# Patient Record
Sex: MALE | Race: WHITE | NOT HISPANIC OR LATINO | URBAN - METROPOLITAN AREA
[De-identification: names, ages, dates, MRNs, and addresses within clinical notes are randomized per-mention and may not be internally consistent; named-entity substitution may affect disease eponyms.]

---

## 2023-03-28 ENCOUNTER — OFFICE VISIT (OUTPATIENT)
Dept: INTERNAL MEDICINE | Facility: CLINIC | Age: 58
End: 2023-03-28
Payer: COMMERCIAL

## 2023-03-28 ENCOUNTER — LAB (OUTPATIENT)
Dept: LAB | Facility: HOSPITAL | Age: 58
End: 2023-03-28
Payer: COMMERCIAL

## 2023-03-28 VITALS
OXYGEN SATURATION: 98 % | HEART RATE: 85 BPM | TEMPERATURE: 98 F | DIASTOLIC BLOOD PRESSURE: 60 MMHG | BODY MASS INDEX: 33.38 KG/M2 | HEIGHT: 71 IN | SYSTOLIC BLOOD PRESSURE: 114 MMHG | WEIGHT: 238.4 LBS

## 2023-03-28 DIAGNOSIS — Z23 NEED FOR VACCINATION: ICD-10-CM

## 2023-03-28 DIAGNOSIS — S99.922A INJURY OF TOE ON LEFT FOOT, INITIAL ENCOUNTER: ICD-10-CM

## 2023-03-28 DIAGNOSIS — Z00.00 PREVENTATIVE HEALTH CARE: Primary | ICD-10-CM

## 2023-03-28 DIAGNOSIS — Z13.29 SCREENING FOR ENDOCRINE DISORDER: ICD-10-CM

## 2023-03-28 DIAGNOSIS — Z12.11 SCREEN FOR COLON CANCER: ICD-10-CM

## 2023-03-28 DIAGNOSIS — F32.0 MILD MAJOR DEPRESSION: ICD-10-CM

## 2023-03-28 DIAGNOSIS — Z13.220 SCREENING, LIPID: ICD-10-CM

## 2023-03-28 DIAGNOSIS — M54.2 NECK PAIN: ICD-10-CM

## 2023-03-28 DIAGNOSIS — Z12.5 SCREENING PSA (PROSTATE SPECIFIC ANTIGEN): ICD-10-CM

## 2023-03-28 DIAGNOSIS — Z13.0 SCREENING, IRON DEFICIENCY ANEMIA: ICD-10-CM

## 2023-03-28 DIAGNOSIS — J45.990 EXERCISE-INDUCED ASTHMA: ICD-10-CM

## 2023-03-28 DIAGNOSIS — Z11.59 ENCOUNTER FOR HEPATITIS C SCREENING TEST FOR LOW RISK PATIENT: ICD-10-CM

## 2023-03-28 DIAGNOSIS — E66.9 OBESITY (BMI 30.0-34.9): ICD-10-CM

## 2023-03-28 PROBLEM — E66.811 OBESITY (BMI 30.0-34.9): Status: ACTIVE | Noted: 2023-03-28

## 2023-03-28 LAB
ALBUMIN SERPL-MCNC: 4.7 G/DL (ref 3.5–5.2)
ALBUMIN/GLOB SERPL: 1.3 G/DL
ALP SERPL-CCNC: 70 U/L (ref 39–117)
ALT SERPL W P-5'-P-CCNC: 33 U/L (ref 1–41)
ANION GAP SERPL CALCULATED.3IONS-SCNC: 9 MMOL/L (ref 5–15)
AST SERPL-CCNC: 25 U/L (ref 1–40)
BASOPHILS # BLD AUTO: 0.1 10*3/MM3 (ref 0–0.2)
BASOPHILS NFR BLD AUTO: 1.1 % (ref 0–1.5)
BILIRUB SERPL-MCNC: 0.6 MG/DL (ref 0–1.2)
BUN SERPL-MCNC: 11 MG/DL (ref 6–20)
BUN/CREAT SERPL: 10.5 (ref 7–25)
CALCIUM SPEC-SCNC: 9.6 MG/DL (ref 8.6–10.5)
CHLORIDE SERPL-SCNC: 100 MMOL/L (ref 98–107)
CHOLEST SERPL-MCNC: 202 MG/DL (ref 0–200)
CO2 SERPL-SCNC: 29 MMOL/L (ref 22–29)
CREAT SERPL-MCNC: 1.05 MG/DL (ref 0.76–1.27)
DEPRECATED RDW RBC AUTO: 41.6 FL (ref 37–54)
EGFRCR SERPLBLD CKD-EPI 2021: 83.8 ML/MIN/1.73
EOSINOPHIL # BLD AUTO: 0.11 10*3/MM3 (ref 0–0.4)
EOSINOPHIL NFR BLD AUTO: 1.3 % (ref 0.3–6.2)
ERYTHROCYTE [DISTWIDTH] IN BLOOD BY AUTOMATED COUNT: 12.8 % (ref 12.3–15.4)
GLOBULIN UR ELPH-MCNC: 3.5 GM/DL
GLUCOSE SERPL-MCNC: 85 MG/DL (ref 65–99)
HCT VFR BLD AUTO: 44.3 % (ref 37.5–51)
HCV AB SER DONR QL: NORMAL
HDLC SERPL-MCNC: 64 MG/DL (ref 40–60)
HGB BLD-MCNC: 15.2 G/DL (ref 13–17.7)
IMM GRANULOCYTES # BLD AUTO: 0.06 10*3/MM3 (ref 0–0.05)
IMM GRANULOCYTES NFR BLD AUTO: 0.7 % (ref 0–0.5)
LDLC SERPL CALC-MCNC: 119 MG/DL (ref 0–100)
LDLC/HDLC SERPL: 1.83 {RATIO}
LYMPHOCYTES # BLD AUTO: 1.95 10*3/MM3 (ref 0.7–3.1)
LYMPHOCYTES NFR BLD AUTO: 22.2 % (ref 19.6–45.3)
MCH RBC QN AUTO: 31 PG (ref 26.6–33)
MCHC RBC AUTO-ENTMCNC: 34.3 G/DL (ref 31.5–35.7)
MCV RBC AUTO: 90.2 FL (ref 79–97)
MONOCYTES # BLD AUTO: 0.52 10*3/MM3 (ref 0.1–0.9)
MONOCYTES NFR BLD AUTO: 5.9 % (ref 5–12)
NEUTROPHILS NFR BLD AUTO: 6.05 10*3/MM3 (ref 1.7–7)
NEUTROPHILS NFR BLD AUTO: 68.8 % (ref 42.7–76)
NRBC BLD AUTO-RTO: 0 /100 WBC (ref 0–0.2)
PLATELET # BLD AUTO: 248 10*3/MM3 (ref 140–450)
PMV BLD AUTO: 8.9 FL (ref 6–12)
POTASSIUM SERPL-SCNC: 4.2 MMOL/L (ref 3.5–5.2)
PROT SERPL-MCNC: 8.2 G/DL (ref 6–8.5)
PSA SERPL-MCNC: 0.91 NG/ML (ref 0–4)
RBC # BLD AUTO: 4.91 10*6/MM3 (ref 4.14–5.8)
SODIUM SERPL-SCNC: 138 MMOL/L (ref 136–145)
TRIGL SERPL-MCNC: 105 MG/DL (ref 0–150)
TSH SERPL DL<=0.05 MIU/L-ACNC: 0.5 UIU/ML (ref 0.27–4.2)
VLDLC SERPL-MCNC: 19 MG/DL (ref 5–40)
WBC NRBC COR # BLD: 8.79 10*3/MM3 (ref 3.4–10.8)

## 2023-03-28 PROCEDURE — 80061 LIPID PANEL: CPT

## 2023-03-28 PROCEDURE — 80050 GENERAL HEALTH PANEL: CPT

## 2023-03-28 PROCEDURE — 86803 HEPATITIS C AB TEST: CPT

## 2023-03-28 PROCEDURE — G0103 PSA SCREENING: HCPCS

## 2023-03-28 RX ORDER — BUPROPION HYDROCHLORIDE 150 MG/1
150 TABLET ORAL DAILY
Qty: 30 TABLET | Refills: 1 | Status: SHIPPED | OUTPATIENT
Start: 2023-03-28

## 2023-03-28 RX ORDER — SILDENAFIL 50 MG/1
50 TABLET, FILM COATED ORAL DAILY PRN
COMMUNITY

## 2023-03-28 RX ORDER — ALBUTEROL SULFATE 90 UG/1
2 AEROSOL, METERED RESPIRATORY (INHALATION) EVERY 6 HOURS PRN
Qty: 18 G | Refills: 0 | Status: SHIPPED | OUTPATIENT
Start: 2023-03-28

## 2023-03-28 NOTE — ASSESSMENT & PLAN NOTE
Mild depression. Get labs and colonoscopy and tdap. Age-appropriate Counseling:  Discussed preventative medicine issues with patient including regular exercise, healthy diet, stress reduction, adequate sleep and recommended age-appropriate screening studies.  Immunizations reviewed.

## 2023-03-28 NOTE — PROGRESS NOTES
"Chief Complaint   Patient presents with   • Nail Problem     Fungus on toes   • Neck Pain     Stiffness in neck   • Asthma     Exercise-induced   • Toe Injury     Left index toe       History of Present Illness  57 y.o. male presents for wellness exam. He has mild depressive symptoms.     Review of Systems   Constitutional: Negative for chills and fever.   Respiratory: Negative for cough and shortness of breath.    Cardiovascular: Negative for chest pain and palpitations.   Gastrointestinal: Negative for abdominal pain, nausea and vomiting.   Musculoskeletal: Positive for arthralgias and neck pain.   Skin: Negative for rash.   Neurological: Negative for dizziness, light-headedness and headaches.   Psychiatric/Behavioral: Positive for decreased concentration and dysphoric mood. Negative for suicidal ideas.   All other systems reviewed and are negative.    .    PMSFH:  The following portions of the patient's history were reviewed and updated as appropriate: allergies, current medications, past family history, past medical history, past social history, past surgical history and problem list.      Current Outpatient Medications:   •  sildenafil (VIAGRA) 50 MG tablet, Take 1 tablet by mouth Daily As Needed for Erectile Dysfunction., Disp: , Rfl:   •  albuterol sulfate  (90 Base) MCG/ACT inhaler, Inhale 2 puffs Every 6 (Six) Hours As Needed for Wheezing or Shortness of Air., Disp: 18 g, Rfl: 0  •  buPROPion XL (WELLBUTRIN XL) 150 MG 24 hr tablet, Take 1 tablet by mouth Daily., Disp: 30 tablet, Rfl: 1    VITALS:  /60 (BP Location: Left arm, Patient Position: Sitting, Cuff Size: Adult)   Pulse 85   Temp 98 °F (36.7 °C) (Infrared)   Ht 179.5 cm (70.67\")   Wt 108 kg (238 lb 6.4 oz)   SpO2 98%   BMI 33.56 kg/m²     Physical Exam  Vitals and nursing note reviewed.   Constitutional:       Appearance: Normal appearance. He is well-developed.   HENT:      Head: Normocephalic.      Right Ear: Tympanic membrane " and ear canal normal.      Left Ear: Tympanic membrane and ear canal normal.   Eyes:      Extraocular Movements: Extraocular movements intact.      Conjunctiva/sclera: Conjunctivae normal.   Cardiovascular:      Rate and Rhythm: Normal rate and regular rhythm.      Heart sounds: Normal heart sounds.   Pulmonary:      Effort: Pulmonary effort is normal. No respiratory distress.      Breath sounds: Normal breath sounds.   Abdominal:      General: Bowel sounds are normal.      Palpations: Abdomen is soft.      Tenderness: There is no abdominal tenderness.      Comments: Obese    Genitourinary:     Comments: 1+ prostate   Skin:     General: Skin is warm and dry.   Neurological:      General: No focal deficit present.      Mental Status: He is alert and oriented to person, place, and time.   Psychiatric:         Mood and Affect: Mood normal.         Behavior: Behavior normal.         LABS:    CBC w/ diff: No results found for: WBC, RBC, HGB, HCT, MCV, MCH, MCHC, RDW, PLT, NEUTRORELPCT, AUTOIGPER, LYMPHORELPCT, MONORELPCT, EOSRELPCT, BASORELPCT  Procedures         ASSESSMENT/PLAN:  Problems Addressed this Visit        Endocrine and Metabolic    Obesity (BMI 30.0-34.9)     Patient's (Body mass index is 33.56 kg/m².) indicates that they are obese (BMI >30) with health conditions that include osteoarthritis . Weight is improving with lifestyle modifications. BMI  is above average; BMI management plan is completed. We discussed portion control and increasing exercise.             Health Encounters    Preventative health care - Primary     Mild depression. Get labs and colonoscopy and tdap. Age-appropriate Counseling:  Discussed preventative medicine issues with patient including regular exercise, healthy diet, stress reduction, adequate sleep and recommended age-appropriate screening studies.  Immunizations reviewed.                 Mental Health    Mild major depression (HCC)     Patient's depression is single episode and is  mild without psychosis. Their depression is currently active and the condition is newly identified. This will be reassessed at the next regular appointment. F/U as described:patient was prescribed an antidepressant medicine Use wellbutrin .         Relevant Medications    buPROPion XL (WELLBUTRIN XL) 150 MG 24 hr tablet       Pulmonary and Pneumonias    Exercise-induced asthma     Use albuterol as needed              Relevant Medications    albuterol sulfate  (90 Base) MCG/ACT inhaler   Other Visit Diagnoses     Screening, lipid        Relevant Orders    Lipid Panel    Screening for endocrine disorder        Relevant Orders    Comprehensive Metabolic Panel    TSH Rfx On Abnormal To Free T4    Screening PSA (prostate specific antigen)        Relevant Orders    PSA Screen    Screen for colon cancer        Relevant Orders    Ambulatory Referral For Screening Colonoscopy    Encounter for hepatitis C screening test for low risk patient        Relevant Orders    Hepatitis C Antibody    Need for vaccination        Relevant Orders    Tdap Vaccine Greater Than or Equal To 6yo IM (Completed)    Screening, iron deficiency anemia        Relevant Orders    CBC & Differential    Injury of toe on left foot, initial encounter        Likely fracture 2nd toe left side and doing better so declined xray or Ortho.     Neck pain        Benign exam and doing better so declined xray or specialist       Diagnoses       Codes Comments    Preventative health care    -  Primary ICD-10-CM: Z00.00  ICD-9-CM: V70.0     Obesity (BMI 30.0-34.9)     ICD-10-CM: E66.9  ICD-9-CM: 278.00     Exercise-induced asthma     ICD-10-CM: J45.990  ICD-9-CM: 493.81     Screening, lipid     ICD-10-CM: Z13.220  ICD-9-CM: V77.91     Screening for endocrine disorder     ICD-10-CM: Z13.29  ICD-9-CM: V77.99     Screening PSA (prostate specific antigen)     ICD-10-CM: Z12.5  ICD-9-CM: V76.44     Screen for colon cancer     ICD-10-CM: Z12.11  ICD-9-CM: V76.51      Encounter for hepatitis C screening test for low risk patient     ICD-10-CM: Z11.59  ICD-9-CM: V73.89     Need for vaccination     ICD-10-CM: Z23  ICD-9-CM: V05.9     Screening, iron deficiency anemia     ICD-10-CM: Z13.0  ICD-9-CM: V78.0     Injury of toe on left foot, initial encounter     ICD-10-CM: S99.922A  ICD-9-CM: 959.7 Likely fracture 2nd toe left side and doing better so declined xray or Ortho.     Neck pain     ICD-10-CM: M54.2  ICD-9-CM: 723.1 Benign exam and doing better so declined xray or specialist     Mild major depression (HCC)     ICD-10-CM: F32.0  ICD-9-CM: 296.21           FOLLOW-UP:  Return in about 6 weeks (around 5/9/2023).      Electronically signed by:    Abelardo Oates MD

## 2023-03-28 NOTE — ASSESSMENT & PLAN NOTE
Patient's (Body mass index is 33.56 kg/m².) indicates that they are obese (BMI >30) with health conditions that include osteoarthritis . Weight is improving with lifestyle modifications. BMI  is above average; BMI management plan is completed. We discussed portion control and increasing exercise.

## 2023-03-28 NOTE — ASSESSMENT & PLAN NOTE
Patient's depression is single episode and is mild without psychosis. Their depression is currently active and the condition is newly identified. This will be reassessed at the next regular appointment. F/U as described:patient was prescribed an antidepressant medicine Use wellbutrin .

## 2023-03-30 PROBLEM — E78.5 HYPERLIPIDEMIA LDL GOAL <100: Status: ACTIVE | Noted: 2023-03-30

## 2023-03-31 ENCOUNTER — PATIENT MESSAGE (OUTPATIENT)
Dept: INTERNAL MEDICINE | Facility: CLINIC | Age: 58
End: 2023-03-31
Payer: COMMERCIAL

## 2023-03-31 NOTE — TELEPHONE ENCOUNTER
From: Khari Wilson  To: Abelardo Oates  Sent: 3/31/2023 2:44 PM EDT  Subject: A1C,std    I was curious if I have an A1C number and also if I was tested for STDs  thanks

## 2023-04-05 ENCOUNTER — PATIENT ROUNDING (BHMG ONLY) (OUTPATIENT)
Dept: INTERNAL MEDICINE | Facility: CLINIC | Age: 58
End: 2023-04-05
Payer: COMMERCIAL

## 2023-04-27 RX ORDER — SODIUM, POTASSIUM,MAG SULFATES 17.5-3.13G
SOLUTION, RECONSTITUTED, ORAL ORAL
Qty: 354 ML | Refills: 0 | Status: SHIPPED | OUTPATIENT
Start: 2023-04-27

## 2023-05-09 ENCOUNTER — OFFICE VISIT (OUTPATIENT)
Dept: INTERNAL MEDICINE | Facility: CLINIC | Age: 58
End: 2023-05-09
Payer: COMMERCIAL

## 2023-05-09 VITALS
SYSTOLIC BLOOD PRESSURE: 104 MMHG | DIASTOLIC BLOOD PRESSURE: 72 MMHG | HEART RATE: 95 BPM | TEMPERATURE: 98.3 F | WEIGHT: 235 LBS | HEIGHT: 71 IN | BODY MASS INDEX: 32.9 KG/M2

## 2023-05-09 DIAGNOSIS — E78.5 HYPERLIPIDEMIA LDL GOAL <100: ICD-10-CM

## 2023-05-09 DIAGNOSIS — F32.0 MILD MAJOR DEPRESSION: Primary | ICD-10-CM

## 2023-05-09 DIAGNOSIS — B35.1 TOENAIL FUNGUS: ICD-10-CM

## 2023-05-09 DIAGNOSIS — E66.9 OBESITY (BMI 30.0-34.9): ICD-10-CM

## 2023-05-09 RX ORDER — BUPROPION HYDROCHLORIDE 150 MG/1
150 TABLET ORAL DAILY
Qty: 90 TABLET | Refills: 2 | Status: SHIPPED | OUTPATIENT
Start: 2023-05-09

## 2023-05-09 RX ORDER — TERBINAFINE HYDROCHLORIDE 250 MG/1
250 TABLET ORAL DAILY
Qty: 84 TABLET | Refills: 0 | Status: SHIPPED | OUTPATIENT
Start: 2023-05-09 | End: 2023-08-01

## 2023-05-09 NOTE — ASSESSMENT & PLAN NOTE
Patient's (Body mass index is 33.08 kg/m².) indicates that they are obese (BMI >30) with health conditions that include dyslipidemias . Weight is unchanged. BMI  is above average; BMI management plan is completed. We discussed portion control and increasing exercise.

## 2023-05-09 NOTE — PROGRESS NOTES
"Chief Complaint   Patient presents with   • Depression       History of Present Illness  57 y.o. male presents for follow up cholesterol and depression.     Review of Systems   Constitutional: Negative for chills and fever.   Respiratory: Negative for cough and shortness of breath.    Cardiovascular: Negative for chest pain and palpitations.   Gastrointestinal: Negative for abdominal pain, nausea and vomiting.   Skin: Negative for rash.   Neurological: Positive for dizziness (rare fleeting dizziness ). Negative for light-headedness and headaches.   Psychiatric/Behavioral: Positive for decreased concentration and dysphoric mood.   All other systems reviewed and are negative.    .    PMSFH:  The following portions of the patient's history were reviewed and updated as appropriate: allergies, current medications, past family history, past medical history, past social history, past surgical history and problem list.      Current Outpatient Medications:   •  albuterol sulfate  (90 Base) MCG/ACT inhaler, Inhale 2 puffs Every 6 (Six) Hours As Needed for Wheezing or Shortness of Air., Disp: 18 g, Rfl: 0  •  buPROPion XL (WELLBUTRIN XL) 150 MG 24 hr tablet, Take 1 tablet by mouth Daily., Disp: 90 tablet, Rfl: 2  •  sildenafil (VIAGRA) 50 MG tablet, Take 1 tablet by mouth Daily As Needed for Erectile Dysfunction., Disp: , Rfl:   •  terbinafine (lamiSIL) 250 MG tablet, Take 1 tablet by mouth Daily for 84 days., Disp: 84 tablet, Rfl: 0    VITALS:  /72   Pulse 95   Temp 98.3 °F (36.8 °C)   Ht 179.5 cm (70.67\")   Wt 107 kg (235 lb)   BMI 33.08 kg/m²     Physical Exam  Vitals and nursing note reviewed.   Constitutional:       Appearance: Normal appearance. He is well-developed.   HENT:      Head: Normocephalic.   Eyes:      Extraocular Movements: Extraocular movements intact.      Conjunctiva/sclera: Conjunctivae normal.   Cardiovascular:      Rate and Rhythm: Normal rate and regular rhythm.      Heart sounds: " Normal heart sounds.   Pulmonary:      Effort: Pulmonary effort is normal. No respiratory distress.      Breath sounds: Normal breath sounds.   Abdominal:      General: Bowel sounds are normal.      Palpations: Abdomen is soft.      Tenderness: There is no abdominal tenderness.   Skin:     General: Skin is warm and dry.   Neurological:      General: No focal deficit present.      Mental Status: He is alert and oriented to person, place, and time.   Psychiatric:         Mood and Affect: Mood normal.         Behavior: Behavior normal.         LABS:    CMP:  Lab Results   Component Value Date    BUN 11 03/28/2023    CREATININE 1.05 03/28/2023     03/28/2023    K 4.2 03/28/2023     03/28/2023    CALCIUM 9.6 03/28/2023    ALBUMIN 4.7 03/28/2023    BILITOT 0.6 03/28/2023    ALKPHOS 70 03/28/2023    AST 25 03/28/2023    ALT 33 03/28/2023     CBC:  Lab Results   Component Value Date    WBC 8.79 03/28/2023    RBC 4.91 03/28/2023    HGB 15.2 03/28/2023    HCT 44.3 03/28/2023    MCV 90.2 03/28/2023    MCH 31.0 03/28/2023    MCHC 34.3 03/28/2023    RDW 12.8 03/28/2023     03/28/2023     LIPID PANEL:  Lab Results   Component Value Date    TRIG 105 03/28/2023    HDL 64 (H) 03/28/2023    VLDL 19 03/28/2023     (H) 03/28/2023    LDLHDL 1.83 03/28/2023     Procedures         ASSESSMENT/PLAN:  Problems Addressed this Visit        Cardiac and Vasculature    Hyperlipidemia LDL goal <100     Lipid abnormalities are improving with lifestyle modifications.  Nutritional counseling was provided.  Lipids will be reassessed in 1 year.            Endocrine and Metabolic    Obesity (BMI 30.0-34.9)     Patient's (Body mass index is 33.08 kg/m².) indicates that they are obese (BMI >30) with health conditions that include dyslipidemias . Weight is unchanged. BMI  is above average; BMI management plan is completed. We discussed portion control and increasing exercise.             Mental Health    Mild major depression -  Primary     Patient's depression is recurrent and is mild without psychosis. Their depression is currently in partial remission and the condition is improving with treatment. This will be reassessed at the next regular appointment. F/U as described:patient will continue current medication therapyCounseled patient regarding multimodal approach with healthy nutrition, healthy sleep, regular physical activity, social activities, and meditation.He is doing well with wellbutrin 150 daily and if does not does quite as well will increase to 300 mg per day  .         Relevant Medications    buPROPion XL (WELLBUTRIN XL) 150 MG 24 hr tablet   Other Visit Diagnoses     Toenail fungus        Try lamisil.     Relevant Medications    terbinafine (lamiSIL) 250 MG tablet      Diagnoses       Codes Comments    Mild major depression    -  Primary ICD-10-CM: F32.0  ICD-9-CM: 296.21     Obesity (BMI 30.0-34.9)     ICD-10-CM: E66.9  ICD-9-CM: 278.00     Hyperlipidemia LDL goal <100     ICD-10-CM: E78.5  ICD-9-CM: 272.4     Toenail fungus     ICD-10-CM: B35.1  ICD-9-CM: 110.1 Try lamisil.           FOLLOW-UP:  Return in about 11 months (around 4/9/2024) for Annual physical.      Electronically signed by:    Abelardo Oates MD

## 2023-05-09 NOTE — ASSESSMENT & PLAN NOTE
Patient's depression is recurrent and is mild without psychosis. Their depression is currently in partial remission and the condition is improving with treatment. This will be reassessed at the next regular appointment. F/U as described:patient will continue current medication therapyCounseled patient regarding multimodal approach with healthy nutrition, healthy sleep, regular physical activity, social activities, and meditation.He is doing well with wellbutrin 150 daily and if does not does quite as well will increase to 300 mg per day  .

## 2023-05-22 RX ORDER — SODIUM, POTASSIUM,MAG SULFATES 17.5-3.13G
SOLUTION, RECONSTITUTED, ORAL ORAL
Qty: 354 ML | Refills: 0 | Status: SHIPPED | OUTPATIENT
Start: 2023-05-22

## 2023-06-01 ENCOUNTER — OUTSIDE FACILITY SERVICE (OUTPATIENT)
Dept: GASTROENTEROLOGY | Facility: CLINIC | Age: 58
End: 2023-06-01
Payer: COMMERCIAL

## 2023-06-01 PROCEDURE — 45385 COLONOSCOPY W/LESION REMOVAL: CPT | Performed by: INTERNAL MEDICINE

## 2024-04-09 ENCOUNTER — OFFICE VISIT (OUTPATIENT)
Dept: INTERNAL MEDICINE | Facility: CLINIC | Age: 59
End: 2024-04-09
Payer: COMMERCIAL

## 2024-04-09 ENCOUNTER — LAB (OUTPATIENT)
Dept: LAB | Facility: HOSPITAL | Age: 59
End: 2024-04-09
Payer: COMMERCIAL

## 2024-04-09 VITALS
HEIGHT: 71 IN | TEMPERATURE: 97.1 F | WEIGHT: 240.2 LBS | OXYGEN SATURATION: 97 % | HEART RATE: 66 BPM | DIASTOLIC BLOOD PRESSURE: 74 MMHG | BODY MASS INDEX: 33.63 KG/M2 | SYSTOLIC BLOOD PRESSURE: 108 MMHG

## 2024-04-09 DIAGNOSIS — E66.9 OBESITY (BMI 30.0-34.9): ICD-10-CM

## 2024-04-09 DIAGNOSIS — E78.5 HYPERLIPIDEMIA LDL GOAL <100: ICD-10-CM

## 2024-04-09 DIAGNOSIS — Z00.00 PREVENTATIVE HEALTH CARE: Primary | ICD-10-CM

## 2024-04-09 DIAGNOSIS — R21 RASH: ICD-10-CM

## 2024-04-09 DIAGNOSIS — N40.1 BENIGN PROSTATIC HYPERPLASIA WITH URINARY FREQUENCY: ICD-10-CM

## 2024-04-09 DIAGNOSIS — Z12.5 SCREENING PSA (PROSTATE SPECIFIC ANTIGEN): ICD-10-CM

## 2024-04-09 DIAGNOSIS — M54.2 CERVICALGIA: ICD-10-CM

## 2024-04-09 DIAGNOSIS — F32.0 MILD MAJOR DEPRESSION: ICD-10-CM

## 2024-04-09 DIAGNOSIS — R35.0 BENIGN PROSTATIC HYPERPLASIA WITH URINARY FREQUENCY: ICD-10-CM

## 2024-04-09 DIAGNOSIS — B35.1 TOENAIL FUNGUS: ICD-10-CM

## 2024-04-09 PROBLEM — F98.8 ATTENTION DEFICIT DISORDER (ADD) WITHOUT HYPERACTIVITY: Status: ACTIVE | Noted: 2024-04-09

## 2024-04-09 LAB
ALBUMIN SERPL-MCNC: 4.4 G/DL (ref 3.5–5.2)
ALBUMIN/GLOB SERPL: 1.3 G/DL
ALP SERPL-CCNC: 74 U/L (ref 39–117)
ALT SERPL W P-5'-P-CCNC: 28 U/L (ref 1–41)
ANION GAP SERPL CALCULATED.3IONS-SCNC: 9.7 MMOL/L (ref 5–15)
AST SERPL-CCNC: 24 U/L (ref 1–40)
BASOPHILS # BLD AUTO: 0.13 10*3/MM3 (ref 0–0.2)
BASOPHILS NFR BLD AUTO: 1.8 % (ref 0–1.5)
BILIRUB SERPL-MCNC: 0.4 MG/DL (ref 0–1.2)
BUN SERPL-MCNC: 16 MG/DL (ref 6–20)
BUN/CREAT SERPL: 15.4 (ref 7–25)
CALCIUM SPEC-SCNC: 9.5 MG/DL (ref 8.6–10.5)
CHLORIDE SERPL-SCNC: 102 MMOL/L (ref 98–107)
CHOLEST SERPL-MCNC: 225 MG/DL (ref 0–200)
CO2 SERPL-SCNC: 26.3 MMOL/L (ref 22–29)
CREAT SERPL-MCNC: 1.04 MG/DL (ref 0.76–1.27)
DEPRECATED RDW RBC AUTO: 42.9 FL (ref 37–54)
EGFRCR SERPLBLD CKD-EPI 2021: 84.3 ML/MIN/1.73
EOSINOPHIL # BLD AUTO: 0.21 10*3/MM3 (ref 0–0.4)
EOSINOPHIL NFR BLD AUTO: 2.9 % (ref 0.3–6.2)
ERYTHROCYTE [DISTWIDTH] IN BLOOD BY AUTOMATED COUNT: 13 % (ref 12.3–15.4)
GLOBULIN UR ELPH-MCNC: 3.3 GM/DL
GLUCOSE SERPL-MCNC: 87 MG/DL (ref 65–99)
HCT VFR BLD AUTO: 47.2 % (ref 37.5–51)
HCYS SERPL-MCNC: 18.3 UMOL/L (ref 0–15)
HDLC SERPL-MCNC: 75 MG/DL (ref 40–60)
HGB BLD-MCNC: 15.8 G/DL (ref 13–17.7)
IMM GRANULOCYTES # BLD AUTO: 0.12 10*3/MM3 (ref 0–0.05)
IMM GRANULOCYTES NFR BLD AUTO: 1.7 % (ref 0–0.5)
LDLC SERPL CALC-MCNC: 132 MG/DL (ref 0–100)
LDLC/HDLC SERPL: 1.72 {RATIO}
LYMPHOCYTES # BLD AUTO: 2.21 10*3/MM3 (ref 0.7–3.1)
LYMPHOCYTES NFR BLD AUTO: 30.6 % (ref 19.6–45.3)
MCH RBC QN AUTO: 30.2 PG (ref 26.6–33)
MCHC RBC AUTO-ENTMCNC: 33.5 G/DL (ref 31.5–35.7)
MCV RBC AUTO: 90.2 FL (ref 79–97)
MONOCYTES # BLD AUTO: 0.69 10*3/MM3 (ref 0.1–0.9)
MONOCYTES NFR BLD AUTO: 9.5 % (ref 5–12)
NEUTROPHILS NFR BLD AUTO: 3.87 10*3/MM3 (ref 1.7–7)
NEUTROPHILS NFR BLD AUTO: 53.5 % (ref 42.7–76)
NRBC BLD AUTO-RTO: 0 /100 WBC (ref 0–0.2)
PLATELET # BLD AUTO: 263 10*3/MM3 (ref 140–450)
PMV BLD AUTO: 8.6 FL (ref 6–12)
POTASSIUM SERPL-SCNC: 5.1 MMOL/L (ref 3.5–5.2)
PROT SERPL-MCNC: 7.7 G/DL (ref 6–8.5)
PSA SERPL-MCNC: 1 NG/ML (ref 0–4)
RBC # BLD AUTO: 5.23 10*6/MM3 (ref 4.14–5.8)
SODIUM SERPL-SCNC: 138 MMOL/L (ref 136–145)
TRIGL SERPL-MCNC: 104 MG/DL (ref 0–150)
TSH SERPL DL<=0.05 MIU/L-ACNC: 1.34 UIU/ML (ref 0.27–4.2)
VLDLC SERPL-MCNC: 18 MG/DL (ref 5–40)
WBC NRBC COR # BLD AUTO: 7.23 10*3/MM3 (ref 3.4–10.8)

## 2024-04-09 PROCEDURE — 83090 ASSAY OF HOMOCYSTEINE: CPT

## 2024-04-09 PROCEDURE — 99396 PREV VISIT EST AGE 40-64: CPT | Performed by: INTERNAL MEDICINE

## 2024-04-09 PROCEDURE — 80061 LIPID PANEL: CPT

## 2024-04-09 PROCEDURE — 99214 OFFICE O/P EST MOD 30 MIN: CPT | Performed by: INTERNAL MEDICINE

## 2024-04-09 PROCEDURE — 80050 GENERAL HEALTH PANEL: CPT

## 2024-04-09 PROCEDURE — G0103 PSA SCREENING: HCPCS

## 2024-04-09 RX ORDER — TERBINAFINE HYDROCHLORIDE 250 MG/1
250 TABLET ORAL DAILY
COMMUNITY

## 2024-04-09 RX ORDER — TADALAFIL 5 MG/1
5 TABLET ORAL DAILY PRN
Qty: 30 TABLET | Refills: 11 | Status: SHIPPED | OUTPATIENT
Start: 2024-04-09

## 2024-04-09 RX ORDER — LISDEXAMFETAMINE DIMESYLATE 50 MG
50 CAPSULE ORAL EVERY MORNING
COMMUNITY
Start: 2024-04-07

## 2024-04-09 RX ORDER — TADALAFIL 20 MG/1
20 TABLET ORAL DAILY PRN
COMMUNITY
Start: 2024-03-20 | End: 2024-04-09

## 2024-04-09 NOTE — ASSESSMENT & PLAN NOTE
Acute issue of neck and reduced range of motion and will do range of motion and get xray . Suggest PT and or chiropractor

## 2024-04-09 NOTE — ASSESSMENT & PLAN NOTE
Lipid abnormalities are stable    Plan:  Improve diet and exercise.      Counseled patient on lifestyle modifications to help control hyperlipidemia.     Patient Treatment Goals:   LDL goal is under 100    Followup in 1 year.

## 2024-04-09 NOTE — ASSESSMENT & PLAN NOTE
He will get eye exam in next 3 months. His mood is ok. Age-appropriate Counseling:  Discussed preventative medicine issues with patient including regular exercise, healthy diet, stress reduction, adequate sleep and recommended age-appropriate screening studies.  Immunizations reviewed.

## 2024-04-09 NOTE — PROGRESS NOTES
"Chief Complaint   Patient presents with    Annual Exam    Hyperlipidemia       History of Present Illness  58 y.o. male presents for wellness exam. He has some mild left neck pain He has had fall a few months ago and limited range of motion to the left side He has toe nail and finger nail fungus and also rash on buttocks     Review of Systems   Constitutional:  Negative for chills and fever.   HENT:  Positive for postnasal drip.    Eyes:  Negative for visual disturbance.   Respiratory:  Negative for cough and shortness of breath.    Cardiovascular:  Negative for chest pain and palpitations.   Gastrointestinal:  Negative for abdominal pain, nausea and vomiting.   Genitourinary:  Positive for frequency. Negative for dysuria.   Musculoskeletal:  Positive for neck pain.   Skin:  Positive for rash.   Neurological:  Negative for dizziness, light-headedness and headaches.   Psychiatric/Behavioral:  Negative for dysphoric mood and sleep disturbance. The patient is not nervous/anxious.    All other systems reviewed and are negative.    .    PMSFH:  The following portions of the patient's history were reviewed and updated as appropriate: allergies, current medications, past family history, past medical history, past social history, past surgical history and problem list.      Current Outpatient Medications:     Vyvanse 50 MG capsule, Take 1 capsule by mouth Every Morning, Disp: , Rfl:     tadalafil (Cialis) 5 MG tablet, Take 1 tablet by mouth Daily As Needed for Erectile Dysfunction., Disp: 30 tablet, Rfl: 11    terbinafine (lamiSIL) 250 MG tablet, Take 1 tablet by mouth Daily., Disp: , Rfl:     VITALS:  /74 (BP Location: Left arm, Patient Position: Sitting, Cuff Size: Adult)   Pulse 66   Temp 97.1 °F (36.2 °C) (Infrared)   Ht 179.5 cm (70.67\")   Wt 109 kg (240 lb 3.2 oz)   SpO2 97%   BMI 33.81 kg/m²     Physical Exam  Vitals and nursing note reviewed.   Constitutional:       Appearance: Normal appearance. He is " well-developed.   HENT:      Head: Normocephalic.      Right Ear: Tympanic membrane and ear canal normal.      Left Ear: Tympanic membrane normal.   Eyes:      Extraocular Movements: Extraocular movements intact.      Conjunctiva/sclera: Conjunctivae normal.   Cardiovascular:      Rate and Rhythm: Normal rate and regular rhythm.      Heart sounds: Normal heart sounds.   Pulmonary:      Effort: Pulmonary effort is normal. No respiratory distress.      Breath sounds: Normal breath sounds.   Abdominal:      General: Bowel sounds are normal.      Palpations: Abdomen is soft.      Tenderness: There is no abdominal tenderness.      Comments: Obese    Genitourinary:     Comments: 1 prostate without appreciated nodules   Musculoskeletal:         General: Tenderness (mild neck pain to the left side) present.      Comments: Thickened nails    Skin:     General: Skin is warm and dry.      Comments: Erythematous nodules buttocks    Neurological:      General: No focal deficit present.      Mental Status: He is alert and oriented to person, place, and time.   Psychiatric:         Mood and Affect: Mood normal.         Behavior: Behavior normal.         LABS:    CMP:  Lab Results   Component Value Date    BUN 11 03/28/2023    CREATININE 1.05 03/28/2023     03/28/2023    K 4.2 03/28/2023     03/28/2023    CALCIUM 9.6 03/28/2023    ALBUMIN 4.7 03/28/2023    BILITOT 0.6 03/28/2023    ALKPHOS 70 03/28/2023    AST 25 03/28/2023    ALT 33 03/28/2023     CBC:  Lab Results   Component Value Date    WBC 8.79 03/28/2023    RBC 4.91 03/28/2023    HGB 15.2 03/28/2023    HCT 44.3 03/28/2023    MCV 90.2 03/28/2023    MCH 31.0 03/28/2023    MCHC 34.3 03/28/2023    RDW 12.8 03/28/2023     03/28/2023     LIPID PANEL:  Lab Results   Component Value Date    TRIG 105 03/28/2023    HDL 64 (H) 03/28/2023    VLDL 19 03/28/2023     (H) 03/28/2023    LDLHDL 1.83 03/28/2023     TSH:  Lab Results   Component Value Date    TSH 0.504  "03/28/2023     HGBA1C (LAST 3):No results found for: \"HGBA1C\"  Procedures         ASSESSMENT/PLAN:  Diagnoses and all orders for this visit:    1. Preventative health care (Primary)  Assessment & Plan:  He will get eye exam in next 3 months. His mood is ok. Age-appropriate Counseling:  Discussed preventative medicine issues with patient including regular exercise, healthy diet, stress reduction, adequate sleep and recommended age-appropriate screening studies.  Immunizations reviewed.          2. Hyperlipidemia LDL goal <100  Assessment & Plan:   Lipid abnormalities are stable    Plan:  Improve diet and exercise.      Counseled patient on lifestyle modifications to help control hyperlipidemia.     Patient Treatment Goals:   LDL goal is under 100    Followup in 1 year.    Orders:  -     Homocysteine; Future  -     CBC & Differential; Future  -     Comprehensive Metabolic Panel; Future  -     Lipid Panel; Future  -     Microalbumin / Creatinine Urine Ratio - Urine, Clean Catch; Future  -     TSH Rfx On Abnormal To Free T4; Future    3. Obesity (BMI 30.0-34.9)  Assessment & Plan:  Patient's (Body mass index is 33.81 kg/m².) indicates that they are obese (BMI >30) with health conditions that include dyslipidemias and osteoarthritis . Weight is worsening. BMI  is above average; BMI management plan is completed. We discussed portion control and increasing exercise.       4. Mild major depression  Assessment & Plan:  Improved with meds to attention and following with psychiatry      5. Cervicalgia  Assessment & Plan:  Acute issue of neck and reduced range of motion and will do range of motion and get xray . Suggest PT and or chiropractor     Orders:  -     XR Spine Cervical 2 or 3 View; Future    6. Screening PSA (prostate specific antigen)  -     PSA Screen; Future    7. Rash  Comments:  Acute issue of likely mild folliculitis of buttocks.    8. Benign prostatic hyperplasia with urinary frequency  Assessment & " Plan:  Acute issue and add cialis daily     Orders:  -     tadalafil (Cialis) 5 MG tablet; Take 1 tablet by mouth Daily As Needed for Erectile Dysfunction.  Dispense: 30 tablet; Refill: 11    9. Toenail fungus  Comments:  Acute issue of nail fungus of toenail and fingernail.Use terbinafine when get labs back        FOLLOW-UP:  Return in about 1 year (around 4/9/2025) for Annual physical, Labs with next visit.      Electronically signed by:    Abelardo Oates MD

## 2024-04-09 NOTE — ASSESSMENT & PLAN NOTE
Patient's (Body mass index is 33.81 kg/m².) indicates that they are obese (BMI >30) with health conditions that include dyslipidemias and osteoarthritis . Weight is worsening. BMI  is above average; BMI management plan is completed. We discussed portion control and increasing exercise.

## 2024-04-16 PROBLEM — R79.89 ELEVATED HOMOCYSTEINE: Status: ACTIVE | Noted: 2024-04-16

## 2024-08-31 ENCOUNTER — PATIENT ROUNDING (BHMG ONLY) (OUTPATIENT)
Dept: URGENT CARE | Facility: CLINIC | Age: 59
End: 2024-08-31
Payer: COMMERCIAL

## 2025-04-14 ENCOUNTER — LAB (OUTPATIENT)
Dept: LAB | Facility: HOSPITAL | Age: 60
End: 2025-04-14
Payer: COMMERCIAL

## 2025-04-14 ENCOUNTER — OFFICE VISIT (OUTPATIENT)
Dept: INTERNAL MEDICINE | Facility: CLINIC | Age: 60
End: 2025-04-14
Payer: COMMERCIAL

## 2025-04-14 VITALS
BODY MASS INDEX: 35.5 KG/M2 | DIASTOLIC BLOOD PRESSURE: 88 MMHG | HEART RATE: 80 BPM | SYSTOLIC BLOOD PRESSURE: 124 MMHG | HEIGHT: 70 IN | TEMPERATURE: 97.7 F | WEIGHT: 248 LBS

## 2025-04-14 DIAGNOSIS — Z12.5 SCREENING PSA (PROSTATE SPECIFIC ANTIGEN): ICD-10-CM

## 2025-04-14 DIAGNOSIS — R79.89 ELEVATED HOMOCYSTEINE: ICD-10-CM

## 2025-04-14 DIAGNOSIS — F98.8 ATTENTION DEFICIT DISORDER (ADD) WITHOUT HYPERACTIVITY: ICD-10-CM

## 2025-04-14 DIAGNOSIS — N40.1 BENIGN PROSTATIC HYPERPLASIA WITH URINARY FREQUENCY: ICD-10-CM

## 2025-04-14 DIAGNOSIS — M25.512 PAIN OF BOTH SHOULDER JOINTS: ICD-10-CM

## 2025-04-14 DIAGNOSIS — M79.10 MYALGIA: ICD-10-CM

## 2025-04-14 DIAGNOSIS — R73.01 IMPAIRED FASTING GLUCOSE: ICD-10-CM

## 2025-04-14 DIAGNOSIS — R35.0 BENIGN PROSTATIC HYPERPLASIA WITH URINARY FREQUENCY: ICD-10-CM

## 2025-04-14 DIAGNOSIS — E66.01 MORBID (SEVERE) OBESITY DUE TO EXCESS CALORIES: ICD-10-CM

## 2025-04-14 DIAGNOSIS — B35.1 NAIL FUNGAL INFECTION: ICD-10-CM

## 2025-04-14 DIAGNOSIS — Z00.00 PREVENTATIVE HEALTH CARE: Primary | ICD-10-CM

## 2025-04-14 DIAGNOSIS — M54.2 CERVICALGIA: ICD-10-CM

## 2025-04-14 DIAGNOSIS — E78.5 HYPERLIPIDEMIA LDL GOAL <100: ICD-10-CM

## 2025-04-14 DIAGNOSIS — M25.511 PAIN OF BOTH SHOULDER JOINTS: ICD-10-CM

## 2025-04-14 DIAGNOSIS — H61.23 BILATERAL IMPACTED CERUMEN: ICD-10-CM

## 2025-04-14 LAB
ALBUMIN SERPL-MCNC: 4.3 G/DL (ref 3.5–5.2)
ALBUMIN UR-MCNC: <1.2 MG/DL
ALBUMIN/GLOB SERPL: 1.3 G/DL
ALP SERPL-CCNC: 76 U/L (ref 39–117)
ALT SERPL W P-5'-P-CCNC: 36 U/L (ref 1–41)
ANION GAP SERPL CALCULATED.3IONS-SCNC: 11.4 MMOL/L (ref 5–15)
AST SERPL-CCNC: 30 U/L (ref 1–40)
BASOPHILS # BLD AUTO: 0.09 10*3/MM3 (ref 0–0.2)
BASOPHILS NFR BLD AUTO: 1.6 % (ref 0–1.5)
BILIRUB SERPL-MCNC: 0.4 MG/DL (ref 0–1.2)
BUN SERPL-MCNC: 10 MG/DL (ref 6–20)
BUN/CREAT SERPL: 9.8 (ref 7–25)
CALCIUM SPEC-SCNC: 9.5 MG/DL (ref 8.6–10.5)
CHLORIDE SERPL-SCNC: 101 MMOL/L (ref 98–107)
CHOLEST SERPL-MCNC: 208 MG/DL (ref 0–200)
CO2 SERPL-SCNC: 24.6 MMOL/L (ref 22–29)
CREAT SERPL-MCNC: 1.02 MG/DL (ref 0.76–1.27)
CREAT UR-MCNC: 114.9 MG/DL
CRP SERPL-MCNC: 0.55 MG/DL (ref 0–0.5)
DEPRECATED RDW RBC AUTO: 42.6 FL (ref 37–54)
EGFRCR SERPLBLD CKD-EPI 2021: 85.7 ML/MIN/1.73
EOSINOPHIL # BLD AUTO: 0.21 10*3/MM3 (ref 0–0.4)
EOSINOPHIL NFR BLD AUTO: 3.8 % (ref 0.3–6.2)
ERYTHROCYTE [DISTWIDTH] IN BLOOD BY AUTOMATED COUNT: 13.1 % (ref 12.3–15.4)
ERYTHROCYTE [SEDIMENTATION RATE] IN BLOOD: 15 MM/HR (ref 0–20)
GLOBULIN UR ELPH-MCNC: 3.4 GM/DL
GLUCOSE SERPL-MCNC: 101 MG/DL (ref 65–99)
HCT VFR BLD AUTO: 45.2 % (ref 37.5–51)
HCYS SERPL-MCNC: 16.5 UMOL/L (ref 0–15)
HDLC SERPL-MCNC: 63 MG/DL (ref 40–60)
HGB BLD-MCNC: 15.1 G/DL (ref 13–17.7)
IMM GRANULOCYTES # BLD AUTO: 0.03 10*3/MM3 (ref 0–0.05)
IMM GRANULOCYTES NFR BLD AUTO: 0.5 % (ref 0–0.5)
LDLC SERPL CALC-MCNC: 131 MG/DL (ref 0–100)
LDLC/HDLC SERPL: 2.05 {RATIO}
LYMPHOCYTES # BLD AUTO: 1.75 10*3/MM3 (ref 0.7–3.1)
LYMPHOCYTES NFR BLD AUTO: 31.3 % (ref 19.6–45.3)
MCH RBC QN AUTO: 29.8 PG (ref 26.6–33)
MCHC RBC AUTO-ENTMCNC: 33.4 G/DL (ref 31.5–35.7)
MCV RBC AUTO: 89.2 FL (ref 79–97)
MICROALBUMIN/CREAT UR: NORMAL MG/G{CREAT}
MONOCYTES # BLD AUTO: 0.6 10*3/MM3 (ref 0.1–0.9)
MONOCYTES NFR BLD AUTO: 10.7 % (ref 5–12)
MYOGLOBIN SERPL-MCNC: 36.9 NG/ML (ref 28–72)
NEUTROPHILS NFR BLD AUTO: 2.92 10*3/MM3 (ref 1.7–7)
NEUTROPHILS NFR BLD AUTO: 52.1 % (ref 42.7–76)
NRBC BLD AUTO-RTO: 0 /100 WBC (ref 0–0.2)
PLATELET # BLD AUTO: 277 10*3/MM3 (ref 140–450)
PMV BLD AUTO: 8.8 FL (ref 6–12)
POTASSIUM SERPL-SCNC: 4.6 MMOL/L (ref 3.5–5.2)
PROT SERPL-MCNC: 7.7 G/DL (ref 6–8.5)
PSA SERPL-MCNC: 1.13 NG/ML (ref 0–4)
RBC # BLD AUTO: 5.07 10*6/MM3 (ref 4.14–5.8)
SODIUM SERPL-SCNC: 137 MMOL/L (ref 136–145)
TRIGL SERPL-MCNC: 79 MG/DL (ref 0–150)
TSH SERPL DL<=0.05 MIU/L-ACNC: 0.74 UIU/ML (ref 0.27–4.2)
VLDLC SERPL-MCNC: 14 MG/DL (ref 5–40)
WBC NRBC COR # BLD AUTO: 5.6 10*3/MM3 (ref 3.4–10.8)

## 2025-04-14 PROCEDURE — 83090 ASSAY OF HOMOCYSTEINE: CPT

## 2025-04-14 PROCEDURE — 83874 ASSAY OF MYOGLOBIN: CPT

## 2025-04-14 PROCEDURE — 80050 GENERAL HEALTH PANEL: CPT

## 2025-04-14 PROCEDURE — 85652 RBC SED RATE AUTOMATED: CPT

## 2025-04-14 PROCEDURE — 83036 HEMOGLOBIN GLYCOSYLATED A1C: CPT

## 2025-04-14 PROCEDURE — 80061 LIPID PANEL: CPT

## 2025-04-14 PROCEDURE — 82043 UR ALBUMIN QUANTITATIVE: CPT

## 2025-04-14 PROCEDURE — 82570 ASSAY OF URINE CREATININE: CPT

## 2025-04-14 PROCEDURE — G0103 PSA SCREENING: HCPCS

## 2025-04-14 PROCEDURE — 86140 C-REACTIVE PROTEIN: CPT

## 2025-04-14 RX ORDER — TADALAFIL 5 MG/1
5 TABLET ORAL DAILY PRN
Qty: 30 TABLET | Refills: 11 | Status: SHIPPED | OUTPATIENT
Start: 2025-04-14

## 2025-04-14 NOTE — ASSESSMENT & PLAN NOTE
He will get an eye exam with [facility] and follow with [facility] for attention and his mood is good overa.. Proceed with colonoscopy next June 2026 Age-appropriate Counseling:  Discussed preventative medicine issues with patient including regular exercise, healthy diet, stress reduction, adequate sleep and recommended age-appropriate screening studies.  Immunizations reviewed.

## 2025-04-14 NOTE — PROGRESS NOTES
"Chief Complaint   Patient presents with    Annual Exam     fasting    Muscle Pain    Shoulder Pain    Neck Pain       History of Present Illness  59 y.o. male presents for wellness exam. He has acute issue of muscle aches and more shoulder pain and neck pain.     Review of Systems   Constitutional:  Negative for chills and fever.   Respiratory:  Negative for cough and shortness of breath.    Cardiovascular:  Negative for chest pain and palpitations.   Gastrointestinal:  Negative for abdominal pain, nausea and vomiting.   Musculoskeletal:  Positive for arthralgias, myalgias and neck pain.   Skin:  Negative for rash.   Neurological:  Negative for dizziness, light-headedness and headaches.   Psychiatric/Behavioral:  Positive for sleep disturbance. Negative for decreased concentration (much improved with meds) and dysphoric mood.    All other systems reviewed and are negative.    .    PMSFH:  The following portions of the patient's history were reviewed and updated as appropriate: allergies, current medications, past family history, past medical history, past social history, past surgical history and problem list.      Current Outpatient Medications:     tadalafil (Cialis) 5 MG tablet, Take 1 tablet by mouth Daily As Needed for Erectile Dysfunction., Disp: 30 tablet, Rfl: 11    terbinafine (lamiSIL) 250 MG tablet, Take 1 tablet by mouth Daily., Disp: , Rfl:     Vyvanse 50 MG capsule, Take 1 capsule by mouth Every Morning, Disp: , Rfl:     VITALS:  /88   Pulse 80   Temp 97.7 °F (36.5 °C)   Ht 177.8 cm (70\")   Wt 112 kg (248 lb)   BMI 35.58 kg/m²     Physical Exam  Vitals and nursing note reviewed.   Constitutional:       Appearance: Normal appearance. He is well-developed.   HENT:      Head: Normocephalic.      Ears:      Comments: Cerumen impaction bilaterally   Eyes:      Extraocular Movements: Extraocular movements intact.      Conjunctiva/sclera: Conjunctivae normal.   Neck:      Vascular: No carotid " "bruit.   Cardiovascular:      Rate and Rhythm: Normal rate and regular rhythm.      Heart sounds: Normal heart sounds.   Pulmonary:      Effort: Pulmonary effort is normal. No respiratory distress.      Breath sounds: Normal breath sounds.   Abdominal:      General: Bowel sounds are normal.      Palpations: Abdomen is soft.      Tenderness: There is no abdominal tenderness. There is no guarding.      Comments: Obese    Genitourinary:     Comments: No prostate nodules appreciated   Musculoskeletal:         General: Tenderness (mild neck pain and mild reduced range of motion to the left) present.   Skin:     General: Skin is warm and dry.      Comments: Nail thickness   Neurological:      Mental Status: He is alert and oriented to person, place, and time.   Psychiatric:         Mood and Affect: Mood normal.         Behavior: Behavior normal.         LABS:    CMP:  Lab Results   Component Value Date    BUN 10 04/14/2025    CREATININE 1.02 04/14/2025     04/14/2025    K 4.6 04/14/2025     04/14/2025    CALCIUM 9.5 04/14/2025    ALBUMIN 4.3 04/14/2025    BILITOT 0.4 04/14/2025    ALKPHOS 76 04/14/2025    AST 30 04/14/2025    ALT 36 04/14/2025     CBC:  Lab Results   Component Value Date    WBC 5.60 04/14/2025    RBC 5.07 04/14/2025    HGB 15.1 04/14/2025    HCT 45.2 04/14/2025    MCV 89.2 04/14/2025    MCH 29.8 04/14/2025    MCHC 33.4 04/14/2025    RDW 13.1 04/14/2025     04/14/2025     LIPID PANEL:  Lab Results   Component Value Date    TRIG 79 04/14/2025    HDL 63 (H) 04/14/2025    VLDL 14 04/14/2025     (H) 04/14/2025    LDLHDL 2.05 04/14/2025     TSH:  Lab Results   Component Value Date    TSH 0.744 04/14/2025     HGBA1C (LAST 3):No results found for: \"HGBA1C\"  Procedures         ASSESSMENT/PLAN:  Diagnoses and all orders for this visit:    1. Preventative health care (Primary)  Assessment & Plan:  He will get an eye exam with [facility] and follow with [facility] for attention and his mood " is good overa.. Proceed with colonoscopy next June 2026 Age-appropriate Counseling:  Discussed preventative medicine issues with patient including regular exercise, healthy diet, stress reduction, adequate sleep and recommended age-appropriate screening studies.  Immunizations reviewed.          2. Morbid (severe) obesity due to excess calories  Assessment & Plan:  Patient's (Body mass index is 35.58 kg/m².) indicates that they are morbidly/severely obese (BMI > 40 or > 35 with obesity - related health condition) with health conditions that include dyslipidemias and osteoarthritis . Weight is newly identified. BMI  is above average; BMI management plan is completed. We discussed portion control and increasing exercise.       3. Hyperlipidemia LDL goal <100  Assessment & Plan:   Lipid abnormalities are worsening    Plan:  Increase fiber and limit processed foods .      Counseled patient on lifestyle modifications to help control hyperlipidemia.     Patient Treatment Goals:   LDL goal is under 100    Followup in 1 year.    Orders:  -     CBC & Differential; Future  -     Homocysteine; Future  -     Cancel: Hemoglobin A1c; Future  -     Lipid Panel; Future  -     Microalbumin / Creatinine Urine Ratio - Urine, Clean Catch; Future  -     TSH Rfx On Abnormal To Free T4; Future    4. Elevated homocysteine  Assessment & Plan:  Suggest folinic plus and betaine       5. Attention deficit disorder (ADD) without hyperactivity  Assessment & Plan:   Doing ok with [facility]       6. Myalgia  -     CBC & Differential; Future  -     Myoglobin, Serum; Future  -     C-reactive Protein; Future  -     Sedimentation Rate; Future  -     Ambulatory Referral to Physical Therapy for Evaluation & Treatment    7. Pain of both shoulder joints  Assessment & Plan:  Acute issue and proceed with labs and proceed with PT     Orders:  -     CBC & Differential; Future  -     Myoglobin, Serum; Future  -     C-reactive Protein; Future  -      Sedimentation Rate; Future  -     Comprehensive Metabolic Panel; Future  -     Ambulatory Referral to Physical Therapy for Evaluation & Treatment    8. Cervicalgia  Assessment & Plan:  Acute issue and proceed with xray and PT     Orders:  -     CBC & Differential; Future  -     Myoglobin, Serum; Future  -     C-reactive Protein; Future  -     Sedimentation Rate; Future  -     XR Spine Cervical 2 or 3 View; Future  -     Cancel: Ambulatory Referral to Physical Therapy for Evaluation & Treatment  -     Ambulatory Referral to Physical Therapy for Evaluation & Treatment    9. Nail fungal infection  Assessment & Plan:  Acute issue and will get liver test and then take lamisil.       10. Screening PSA (prostate specific antigen)  -     PSA Screen; Future    11. Bilateral impacted cerumen  Comments:  Acute issue and use debrox and encourage ENT and he will consider    12. Benign prostatic hyperplasia with urinary frequency  Assessment & Plan:  Doing ok.     Orders:  -     tadalafil (Cialis) 5 MG tablet; Take 1 tablet by mouth Daily As Needed for Erectile Dysfunction.  Dispense: 30 tablet; Refill: 11    13. Impaired fasting glucose  Assessment & Plan:  Acute issue of fasting blood sugar > 90 and add HBA1c and encourage reducing bad carbohydrates, specifically sweets, breads, potatoes, corn and high caloric drinks (juices, sodas, sweet tea).  Also recommend increasing physical activity, ideally 150 minutes aerobic exercise weekly and resistance exercises 2-3x/week.     Orders:  -     Hemoglobin A1c; Future        FOLLOW-UP:  Return in about 1 year (around 4/14/2026) for Annual physical.      Electronically signed by:    Abelardo Oates MD

## 2025-04-14 NOTE — ASSESSMENT & PLAN NOTE
Lipid abnormalities are worsening    Plan:  Increase fiber and limit processed foods .      Counseled patient on lifestyle modifications to help control hyperlipidemia.     Patient Treatment Goals:   LDL goal is under 100    Followup in 1 year.

## 2025-04-14 NOTE — ASSESSMENT & PLAN NOTE
Patient's (Body mass index is 35.58 kg/m².) indicates that they are morbidly/severely obese (BMI > 40 or > 35 with obesity - related health condition) with health conditions that include dyslipidemias and osteoarthritis . Weight is newly identified. BMI  is above average; BMI management plan is completed. We discussed portion control and increasing exercise.

## 2025-04-15 PROBLEM — R73.01 IMPAIRED FASTING GLUCOSE: Status: ACTIVE | Noted: 2025-04-15

## 2025-04-15 LAB — HBA1C MFR BLD: 5.4 % (ref 4.8–5.6)

## 2025-04-15 NOTE — ASSESSMENT & PLAN NOTE
Acute issue of fasting blood sugar > 90 and add HBA1c and encourage reducing bad carbohydrates, specifically sweets, breads, potatoes, corn and high caloric drinks (juices, sodas, sweet tea).  Also recommend increasing physical activity, ideally 150 minutes aerobic exercise weekly and resistance exercises 2-3x/week.

## 2025-05-31 DIAGNOSIS — N40.1 BENIGN PROSTATIC HYPERPLASIA WITH URINARY FREQUENCY: ICD-10-CM

## 2025-05-31 DIAGNOSIS — R35.0 BENIGN PROSTATIC HYPERPLASIA WITH URINARY FREQUENCY: ICD-10-CM

## 2025-06-02 RX ORDER — TADALAFIL 5 MG/1
5 TABLET ORAL DAILY PRN
Qty: 30 TABLET | Refills: 11 | Status: SHIPPED | OUTPATIENT
Start: 2025-06-02